# Patient Record
Sex: FEMALE | Race: BLACK OR AFRICAN AMERICAN | URBAN - METROPOLITAN AREA
[De-identification: names, ages, dates, MRNs, and addresses within clinical notes are randomized per-mention and may not be internally consistent; named-entity substitution may affect disease eponyms.]

---

## 2019-07-01 ENCOUNTER — OFFICE VISIT (OUTPATIENT)
Dept: INTERNAL MEDICINE CLINIC | Age: 24
End: 2019-07-01

## 2019-07-01 VITALS
RESPIRATION RATE: 16 BRPM | DIASTOLIC BLOOD PRESSURE: 70 MMHG | TEMPERATURE: 97.5 F | BODY MASS INDEX: 20.66 KG/M2 | WEIGHT: 124 LBS | SYSTOLIC BLOOD PRESSURE: 107 MMHG | HEIGHT: 65 IN | HEART RATE: 82 BPM | OXYGEN SATURATION: 97 %

## 2019-07-01 DIAGNOSIS — Z01.84 IMMUNITY STATUS TESTING: ICD-10-CM

## 2019-07-01 DIAGNOSIS — Z00.00 ROUTINE GENERAL MEDICAL EXAMINATION AT A HEALTH CARE FACILITY: Primary | ICD-10-CM

## 2019-07-01 DIAGNOSIS — Z11.1 SCREENING-PULMONARY TB: ICD-10-CM

## 2019-07-01 NOTE — PROGRESS NOTES
Chief Complaint   Patient presents with    Physical     Physical for school. Would like to have CXR rather than PPD due to time restrictions. 1. Have you been to the ER, urgent care clinic since your last visit? Hospitalized since your last visit? No    2. Have you seen or consulted any other health care providers outside of the [address] [place] [address] since your last visit? Include any pap smears or colon screening.  No     3 most recent PHQ Screens 7/1/2019   Little interest or pleasure in doing things Not at all   Feeling down, depressed, irritable, or hopeless Not at all   Total Score PHQ 2 0

## 2019-07-03 LAB
ALBUMIN SERPL-MCNC: 4.9 G/DL (ref 3.5–5.5)
ALBUMIN/GLOB SERPL: 2 {RATIO} (ref 1.2–2.2)
ALP SERPL-CCNC: 66 IU/L (ref 39–117)
ALT SERPL-CCNC: 11 IU/L (ref 0–32)
AST SERPL-CCNC: 16 IU/L (ref 0–40)
BASOPHILS # BLD AUTO: 0 X10E3/UL (ref 0–0.2)
BASOPHILS NFR BLD AUTO: 0 %
BILIRUB SERPL-MCNC: 0.4 MG/DL (ref 0–1.2)
BUN SERPL-MCNC: 15 MG/DL (ref 6–20)
BUN/CREAT SERPL: 22 (ref 9–23)
CALCIUM SERPL-MCNC: 9.7 MG/DL (ref 8.7–10.2)
CHLORIDE SERPL-SCNC: 107 MMOL/L (ref 96–106)
CHOLEST SERPL-MCNC: 205 MG/DL (ref 100–199)
CO2 SERPL-SCNC: 24 MMOL/L (ref 20–29)
CREAT SERPL-MCNC: 0.67 MG/DL (ref 0.57–1)
EOSINOPHIL # BLD AUTO: 0 X10E3/UL (ref 0–0.4)
EOSINOPHIL NFR BLD AUTO: 0 %
ERYTHROCYTE [DISTWIDTH] IN BLOOD BY AUTOMATED COUNT: 13.1 % (ref 12.3–15.4)
GLOBULIN SER CALC-MCNC: 2.5 G/DL (ref 1.5–4.5)
GLUCOSE SERPL-MCNC: 68 MG/DL (ref 65–99)
HBV E AB SERPL QL IA: NEGATIVE
HCT VFR BLD AUTO: 41.7 % (ref 34–46.6)
HDLC SERPL-MCNC: 81 MG/DL
HGB BLD-MCNC: 13.2 G/DL (ref 11.1–15.9)
IMM GRANULOCYTES # BLD AUTO: 0 X10E3/UL (ref 0–0.1)
IMM GRANULOCYTES NFR BLD AUTO: 0 %
LDLC SERPL CALC-MCNC: 110 MG/DL (ref 0–99)
LYMPHOCYTES # BLD AUTO: 2.8 X10E3/UL (ref 0.7–3.1)
LYMPHOCYTES NFR BLD AUTO: 41 %
MCH RBC QN AUTO: 29.3 PG (ref 26.6–33)
MCHC RBC AUTO-ENTMCNC: 31.7 G/DL (ref 31.5–35.7)
MCV RBC AUTO: 93 FL (ref 79–97)
MEV IGG SER IA-ACNC: 78.9 AU/ML
MONOCYTES # BLD AUTO: 0.4 X10E3/UL (ref 0.1–0.9)
MONOCYTES NFR BLD AUTO: 6 %
MUV IGG SER IA-ACNC: 69.7 AU/ML
NEUTROPHILS # BLD AUTO: 3.5 X10E3/UL (ref 1.4–7)
NEUTROPHILS NFR BLD AUTO: 53 %
PLATELET # BLD AUTO: 258 X10E3/UL (ref 150–450)
POTASSIUM SERPL-SCNC: 3.4 MMOL/L (ref 3.5–5.2)
PROT SERPL-MCNC: 7.4 G/DL (ref 6–8.5)
RBC # BLD AUTO: 4.51 X10E6/UL (ref 3.77–5.28)
RUBV IGG SERPL IA-ACNC: 2.17 INDEX
SODIUM SERPL-SCNC: 135 MMOL/L (ref 134–144)
TRIGL SERPL-MCNC: 71 MG/DL (ref 0–149)
TSH SERPL DL<=0.005 MIU/L-ACNC: 1.62 UIU/ML (ref 0.45–4.5)
VLDLC SERPL CALC-MCNC: 14 MG/DL (ref 5–40)
VZV IGG SER IA-ACNC: 3314 INDEX
WBC # BLD AUTO: 6.8 X10E3/UL (ref 3.4–10.8)

## 2019-07-05 ENCOUNTER — TELEPHONE (OUTPATIENT)
Dept: INTERNAL MEDICINE CLINIC | Age: 24
End: 2019-07-05

## 2019-07-05 NOTE — TELEPHONE ENCOUNTER
Spoke with pt and gave lab results. Waiting on Hep B which should be back by Monday. Pt will come by Monday for a copy of her titers.

## 2019-07-07 NOTE — PATIENT INSTRUCTIONS
Chest X-Rays: About These Tests  What is it? A chest X-ray is a picture of the chest that shows your heart, lungs, airway, blood vessels, and lymph nodes. Chest X-rays can also show the bones of your spine and chest.  Why is this test done? A chest X-ray is done to find problems with the organs and structures inside the chest.  How can you prepare for the test?  · Tell your doctor if you are or might be pregnant. A chest X-ray is usually not done during pregnancy, but the chance of harm to the fetus is very small. If you need a chest X-ray during pregnancy, you will wear a lead apron to help protect your baby. What happens before the test?  · Remove any jewelry that might get in the way of the X-ray picture. · You may need to take off all or most of your clothes above the waist. You will be given a gown to wear during the test.  What happens during the test?  Two X-ray views of the chest are usually taken. One view is taken from the back. The other view is taken from the side. · You stand with your chest against an X-ray plate for the pictures. · You will need to hold very still while the X-ray is taken. You may be asked to hold your breath for a few seconds. What else should you know about the test?  · You won't feel any pain from the chest X-ray itself. · If you have pain from a chest problem, you may feel some discomfort if you need to hold a certain position, breathe deep, or hold your breath while the X-ray is done. How long does the test take? · The test will take about 10 minutes. What happens after the test?  · You will probably be able to go home right away. The results of a chest X-ray are usually available in 1 to 2 days. · You can go back to your usual activities right away. Follow-up care is a key part of your treatment and safety. Be sure to make and go to all appointments, and call your doctor if you are having problems. It's also a good idea to keep a list of the medicines you take. Ask your doctor when you can expect to have your test results. Where can you learn more? Go to http://alvaro-jennifer.info/. Enter E286 in the search box to learn more about \"Chest X-Rays: About These Tests. \"  Current as of: June 25, 2018  Content Version: 11.9  © 0472-1648 [employer]. Care instructions adapted under license by [employer] (which disclaims liability or warranty for this information). If you have questions about a medical condition or this instruction, always ask your healthcare professional. [address] any warranty or liability for your use of this information.

## 2019-07-07 NOTE — PROGRESS NOTES
HISTORY OF PRESENT ILLNESS  Allan Chisholm is a 25 y.o. female. HPI   Pt is here for CPE for school and needs Tb test and immunizations/titers. She has no complaints today but would like CXR for Tb screening if possible due to needing results soon. No Known Allergies    No current outpatient medications on file. No current facility-administered medications for this visit. Review of Systems   Constitutional: Negative. Negative for chills, fever and malaise/fatigue. HENT: Negative. Negative for congestion, ear pain, sore throat and tinnitus. Eyes: Negative. Negative for blurred vision, double vision and photophobia. Respiratory: Negative. Negative for cough, shortness of breath and wheezing. Cardiovascular: Negative. Negative for chest pain, palpitations and leg swelling. Gastrointestinal: Negative. Negative for abdominal pain, heartburn, nausea and vomiting. Genitourinary: Negative. Negative for dysuria, frequency, hematuria and urgency. Musculoskeletal: Negative. Negative for back pain, joint pain, myalgias and neck pain. Skin: Negative. Negative for itching and rash. Neurological: Negative. Negative for dizziness, tingling, tremors and headaches. Psychiatric/Behavioral: Negative. Negative for depression and memory loss. The patient is not nervous/anxious and does not have insomnia. Visit Vitals  /70 (BP 1 Location: Left arm, BP Patient Position: Sitting)   Pulse 82   Temp 97.5 °F (36.4 °C) (Oral)   Resp 16   Ht 5' 5\" (1.651 m)   Wt 124 lb (56.2 kg)   SpO2 97%   BMI 20.63 kg/m²         Physical Exam   Constitutional: She is oriented to person, place, and time. She appears well-developed and well-nourished. No distress. HENT:   Head: Normocephalic and atraumatic.    Right Ear: Tympanic membrane, external ear and ear canal normal.   Left Ear: Tympanic membrane, external ear and ear canal normal.   Nose: Nose normal.   Mouth/Throat: Uvula is midline, oropharynx is clear and moist and mucous membranes are normal.   Eyes: Pupils are equal, round, and reactive to light. Conjunctivae and EOM are normal.   Neck: Normal range of motion. Neck supple. No thyromegaly present. Cardiovascular: Normal rate, regular rhythm, normal heart sounds and intact distal pulses. Exam reveals no gallop and no friction rub. No murmur heard. Pulmonary/Chest: Effort normal and breath sounds normal. No respiratory distress. She has no wheezes. She has no rales. Abdominal: Soft. Bowel sounds are normal. She exhibits no distension and no mass. There is no tenderness. There is no rebound and no guarding. Musculoskeletal: Normal range of motion. She exhibits no edema or tenderness. Neurological: She is alert and oriented to person, place, and time. Skin: Skin is warm and dry. She is not diaphoretic. Psychiatric: She has a normal mood and affect. Her behavior is normal.       ASSESSMENT and PLAN    ICD-10-CM ICD-9-CM    1. Routine general medical examination at a health care facility Z00.00 V70.0 XR CHEST PA LAT      METABOLIC PANEL, COMPREHENSIVE      TSH 3RD GENERATION      LIPID PANEL      CBC WITH AUTOMATED DIFF      CBC WITH AUTOMATED DIFF      LIPID PANEL      TSH 3RD GENERATION      METABOLIC PANEL, COMPREHENSIVE   2. Immunity status testing Z01.84 V72.61 RUBEOLA AB, IGG      MUMPS AB, IGG      RUBELLA AB, IGG      VZV AB, IGG      HEPATITIS BE AB      HEPATITIS BE AB      VZV AB, IGG      RUBELLA AB, IGG      MUMPS AB, IGG      RUBEOLA AB, IGG   3. Screening-pulmonary TB Z11.1 V74.1 XR CHEST PA LAT     Follow-up and Dispositions    · Return in about 1 year (around 7/1/2020) for cpe. Pt expressed understanding of visit summary and plans for any follow ups or referrals as well as any medications prescribed.

## 2019-07-09 LAB
HBV SURFACE AB SER-ACNC: 22.1 MIU/ML
SPECIMEN STATUS REPORT, ROLRST: NORMAL

## 2019-07-31 ENCOUNTER — OFFICE VISIT (OUTPATIENT)
Dept: INTERNAL MEDICINE CLINIC | Age: 24
End: 2019-07-31

## 2019-07-31 DIAGNOSIS — Z23 ENCOUNTER FOR IMMUNIZATION: Primary | ICD-10-CM

## 2019-08-05 NOTE — PATIENT INSTRUCTIONS
Learning About Hepatitis B  What is hepatitis B? Hepatitis B is a liver infection. It is caused by the hepatitis B virus. The virus can be spread:  · Through infected blood, semen, and other body fluids during sexual contact. · When people share needles to inject drugs. · When an infected person shares items that may have blood on them, such as a razor or toothbrush. · When needles used for tattoos, body piercing, or acupuncture are not cleaned properly. People who handle blood may become infected with the virus. For example, health care workers may get the virus when they treat an infected person. A mother who has the virus can pass it to her baby during delivery. If you are pregnant and think you may have been exposed to hepatitis B, get tested. If you have the virus, your baby can get shots to help prevent getting the virus. You can't get hepatitis B from casual contact such as hugging, kissing, sneezing, coughing, or sharing food or drinks. What happens when you have hepatitis B? Most adults who get hepatitis B have it for a short time and then get better. This is the acute form of the disease. Sometimes the disease lasts a long time. This is the chronic form. Over time, chronic hepatitis B can damage your liver. After you have had hepatitis B and recovered, you will not get it again. What are the symptoms? Most people who get hepatitis B do not have symptoms. If you have symptoms, they usually start to go away in 2 to 3 weeks. Symptoms may include:  · Tiredness. · Fever. · Nausea. · Vomiting. · Light-colored stools. · Dark urine. · Yellow skin and eyes (jaundice). How can you prevent hepatitis B? You can get hepatitis B or give it to other people both before and after you have symptoms. Ask your doctor if you need the hepatitis B vaccine.  People who may need it include those who inject drugs, have many sex partners, have certain health conditions, or are likely to be exposed to body fluids (such as health care workers). Your doctor may also recommend the vaccine if you're traveling to areas of the world where hepatitis B is common. To avoid spreading hepatitis B if you have it or to avoid getting it:  · Use a condom when you have sex. · Do not share needles. · Do not share toothbrushes or razors. · Wear disposable gloves if you have to touch blood. · Make sure that needles used for tattoos, body piercing, or acupuncture are cleaned properly. If you think you have been exposed to the hepatitis B virus, talk to your doctor. Getting a shot of hepatitis B immunoglobulin (HBIG) and the first of three shots of hepatitis B vaccine may help prevent the disease. How is it treated? · If you have acute hepatitis B, you most likely will not need treatment. · If you have chronic hepatitis B, you will have to see your doctor regularly to have your liver checked. You may need antiviral medicines to prevent liver damage. Follow-up care is a key part of your treatment and safety. Be sure to make and go to all appointments, and call your doctor if you are having problems. It's also a good idea to know your test results and keep a list of the medicines you take. Where can you learn more? Go to http://alvaro-jennifer.info/. Enter S308 in the search box to learn more about \"Learning About Hepatitis B. \"  Current as of: July 30, 2018  Content Version: 12.1  © 5863-5781 [employer]. Care instructions adapted under license by [employer] (which disclaims liability or warranty for this information). If you have questions about a medical condition or this instruction, always ask your healthcare professional. [address] any warranty or liability for your use of this information.

## 2021-02-24 ENCOUNTER — VIRTUAL VISIT (OUTPATIENT)
Dept: FAMILY MEDICINE CLINIC | Age: 26
End: 2021-02-24

## 2021-02-24 RX ORDER — ACETAMINOPHEN 325 MG/1
TABLET ORAL
COMMUNITY

## 2021-02-24 NOTE — PROGRESS NOTES
Chief Complaint   Patient presents with    New Patient     Previous PCP was DR. Nate Crawford    Contraception